# Patient Record
Sex: FEMALE | URBAN - METROPOLITAN AREA
[De-identification: names, ages, dates, MRNs, and addresses within clinical notes are randomized per-mention and may not be internally consistent; named-entity substitution may affect disease eponyms.]

---

## 2022-03-18 ENCOUNTER — NURSE TRIAGE (OUTPATIENT)
Dept: ADMINISTRATIVE | Facility: CLINIC | Age: 27
End: 2022-03-18

## 2022-03-18 NOTE — TELEPHONE ENCOUNTER
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  In pain all night.  2016 dgn in the past. Pain Mid back and radiates into center of stomach.  Pain rate 8/10,   And higher,   X 4 days.  Denies pregnant . No problems urinating. Positive for n/v.   Urine is yellow. Denies Bladder,  Pain.     Care advise is to call 911.   If any assistance needed  Call back OOC Rn  Number given.  Patient VU. Gave her directions to [facility].       Reason for Disposition   Shock suspected (e.g., cold/pale/clammy skin, too weak to stand, low BP, rapid pulse)    Additional Information   Negative: Passed out (i.e., fainted, collapsed and was not responding)    Protocols used: ABDOMINAL PAIN - FEMALE-A-OH